# Patient Record
Sex: FEMALE
[De-identification: names, ages, dates, MRNs, and addresses within clinical notes are randomized per-mention and may not be internally consistent; named-entity substitution may affect disease eponyms.]

---

## 2017-08-23 ENCOUNTER — RESULT REVIEW (OUTPATIENT)
Age: 57
End: 2017-08-23

## 2018-07-31 ENCOUNTER — APPOINTMENT (OUTPATIENT)
Dept: BREAST CENTER | Facility: CLINIC | Age: 58
End: 2018-07-31

## 2019-05-23 ENCOUNTER — RECORD ABSTRACTING (OUTPATIENT)
Age: 59
End: 2019-05-23

## 2019-05-23 DIAGNOSIS — Z78.0 ASYMPTOMATIC MENOPAUSAL STATE: ICD-10-CM

## 2019-05-23 DIAGNOSIS — Z87.42 PERSONAL HISTORY OF OTHER DISEASES OF THE FEMALE GENITAL TRACT: ICD-10-CM

## 2019-05-23 DIAGNOSIS — Z85.3 PERSONAL HISTORY OF MALIGNANT NEOPLASM OF BREAST: ICD-10-CM

## 2019-05-23 DIAGNOSIS — Z87.898 PERSONAL HISTORY OF OTHER SPECIFIED CONDITIONS: ICD-10-CM

## 2019-05-23 DIAGNOSIS — Z92.89 PERSONAL HISTORY OF OTHER MEDICAL TREATMENT: ICD-10-CM

## 2019-05-23 DIAGNOSIS — Z80.8 FAMILY HISTORY OF MALIGNANT NEOPLASM OF OTHER ORGANS OR SYSTEMS: ICD-10-CM

## 2019-05-23 DIAGNOSIS — Z86.39 PERSONAL HISTORY OF OTHER ENDOCRINE, NUTRITIONAL AND METABOLIC DISEASE: ICD-10-CM

## 2019-05-23 LAB — CYTOLOGY CVX/VAG DOC THIN PREP: NORMAL

## 2019-05-24 ENCOUNTER — APPOINTMENT (OUTPATIENT)
Dept: OBGYN | Facility: CLINIC | Age: 59
End: 2019-05-24
Payer: COMMERCIAL

## 2019-05-24 VITALS
SYSTOLIC BLOOD PRESSURE: 120 MMHG | WEIGHT: 144 LBS | BODY MASS INDEX: 23.99 KG/M2 | HEIGHT: 65 IN | DIASTOLIC BLOOD PRESSURE: 70 MMHG

## 2019-05-24 DIAGNOSIS — Z12.31 ENCOUNTER FOR SCREENING MAMMOGRAM FOR MALIGNANT NEOPLASM OF BREAST: ICD-10-CM

## 2019-05-24 LAB
BILIRUB UR QL STRIP: NORMAL
DATE COLLECTED: NORMAL
GLUCOSE UR-MCNC: NORMAL
HCG UR QL: 0.2 EU/DL
HEMOCCULT SP1 STL QL: NEGATIVE
HGB UR QL STRIP.AUTO: NORMAL
KETONES UR-MCNC: NORMAL
LEUKOCYTE ESTERASE UR QL STRIP: NORMAL
NITRITE UR QL STRIP: NORMAL
PH UR STRIP: 5.5
PROT UR STRIP-MCNC: NORMAL
QUALITY CONTROL: YES
SP GR UR STRIP: 1.01

## 2019-05-24 PROCEDURE — 81003 URINALYSIS AUTO W/O SCOPE: CPT | Mod: QW

## 2019-05-24 PROCEDURE — 99396 PREV VISIT EST AGE 40-64: CPT

## 2019-05-24 PROCEDURE — 82270 OCCULT BLOOD FECES: CPT

## 2019-05-24 RX ORDER — DENOSUMAB 60 MG/ML
60 INJECTION SUBCUTANEOUS
Refills: 0 | Status: DISCONTINUED | COMMUNITY
End: 2019-05-24

## 2019-05-24 NOTE — REVIEW OF SYSTEMS
[Nl] : Integumentary [Fever] : no fever [Chills] : no chills [Chest Pain] : no chest pain [Dyspnea] : no shortness of breath [Vomiting] : no vomiting [Abdominal Pain] : no abdominal pain [Pelvic Pain] : no pelvic pain [Abn Vag Bleeding] : no abnormal vaginal bleeding

## 2019-05-24 NOTE — DISCUSSION/SUMMARY
[FreeTextEntry1] : No pap done since cervix is absent.\par \par Continue breast care with specialist.

## 2019-05-24 NOTE — HISTORY OF PRESENT ILLNESS
[Last Mammogram ___] : Last Mammogram was [unfilled] [Last Pap ___] : Last cervical pap smear was [unfilled] [Currently In Menopause] : currently in menopause [Sexually Active] : is sexually active [Monogamous] : is monogamous [Dyspareunia] : dyspareunia [unknown] : the patient is unsure of the date of her LMP [Menarche Age: ____] : age at menarche was [unfilled] [de-identified] : BRUS 2/19/18 BIRAD 3 [Itching] : no itching [Mass] : no mass [Burning] : no burning [Stinging] : no stinging [Lesion] : no lesion [Soreness] : no soreness [Discharge] : no discharge [FreeTextEntry8] : Vaginal dryness [Contraception] : does not use contraception

## 2019-06-13 ENCOUNTER — ASOB RESULT (OUTPATIENT)
Age: 59
End: 2019-06-13

## 2019-06-13 ENCOUNTER — APPOINTMENT (OUTPATIENT)
Dept: OBGYN | Facility: CLINIC | Age: 59
End: 2019-06-13
Payer: COMMERCIAL

## 2019-06-13 PROCEDURE — 76830 TRANSVAGINAL US NON-OB: CPT

## 2020-10-01 ENCOUNTER — APPOINTMENT (OUTPATIENT)
Dept: OBGYN | Facility: CLINIC | Age: 60
End: 2020-10-01
Payer: COMMERCIAL

## 2020-10-01 VITALS
DIASTOLIC BLOOD PRESSURE: 70 MMHG | WEIGHT: 135 LBS | SYSTOLIC BLOOD PRESSURE: 115 MMHG | HEIGHT: 64 IN | BODY MASS INDEX: 23.05 KG/M2

## 2020-10-01 DIAGNOSIS — Z82.69 FAMILY HISTORY OF OTHER DISEASES OF THE MUSCULOSKELETAL SYSTEM AND CONNECTIVE TISSUE: ICD-10-CM

## 2020-10-01 PROCEDURE — 99396 PREV VISIT EST AGE 40-64: CPT

## 2020-10-01 PROCEDURE — XXXXX: CPT

## 2020-10-01 NOTE — PHYSICAL EXAM
[Appropriately responsive] : appropriately responsive [Alert] : alert [No Acute Distress] : no acute distress [Oriented x3] : oriented x3 [No Masses] : no breast masses were palpable [Labia Majora] : normal [Labia Minora] : normal [Normal] : normal [Atrophy] : atrophy [Absent] : absent

## 2020-12-21 PROBLEM — Z12.31 ENCOUNTER FOR SCREENING MAMMOGRAM FOR BREAST CANCER: Status: RESOLVED | Noted: 2019-05-24 | Resolved: 2020-12-21

## 2021-04-23 NOTE — PLAN
[FreeTextEntry1] : The benefits of adequate calcium and vitamin D3 intake combined with weight bearing exercise for bone protection were reviewed.  DEXA rx provided- pt recommended to see rheumatology for consult if osteoporotic.\par \par - continue follow up with Dr. Castañeda (breast surgeon) as planned\par - pap deferred- pt s/p total hysterectomy\par - continue coconut oil for vaginal dryness.

## 2021-10-04 ENCOUNTER — APPOINTMENT (OUTPATIENT)
Dept: OBGYN | Facility: CLINIC | Age: 61
End: 2021-10-04
Payer: COMMERCIAL

## 2021-10-04 VITALS
DIASTOLIC BLOOD PRESSURE: 76 MMHG | HEIGHT: 64 IN | SYSTOLIC BLOOD PRESSURE: 110 MMHG | BODY MASS INDEX: 23.56 KG/M2 | WEIGHT: 138 LBS

## 2021-10-04 DIAGNOSIS — R39.16 STRAINING TO VOID: ICD-10-CM

## 2021-10-04 DIAGNOSIS — Z87.448 PERSONAL HISTORY OF OTHER DISEASES OF URINARY SYSTEM: ICD-10-CM

## 2021-10-04 DIAGNOSIS — Z13.820 ENCOUNTER FOR SCREENING FOR OSTEOPOROSIS: ICD-10-CM

## 2021-10-04 DIAGNOSIS — Z87.898 PERSONAL HISTORY OF OTHER SPECIFIED CONDITIONS: ICD-10-CM

## 2021-10-04 DIAGNOSIS — Z01.419 ENCOUNTER FOR GYNECOLOGICAL EXAMINATION (GENERAL) (ROUTINE) W/OUT ABNORMAL FINDINGS: ICD-10-CM

## 2021-10-04 PROCEDURE — 82270 OCCULT BLOOD FECES: CPT

## 2021-10-04 PROCEDURE — 99396 PREV VISIT EST AGE 40-64: CPT

## 2021-10-05 LAB
DATE COLLECTED: NORMAL
HEMOCCULT SP1 STL QL: NEGATIVE
QUALITY CONTROL: YES

## 2021-10-06 PROBLEM — Z01.419 WELL WOMAN EXAM WITH ROUTINE GYNECOLOGICAL EXAM: Status: RESOLVED | Noted: 2019-05-24 | Resolved: 2021-10-06

## 2021-10-06 PROBLEM — Z13.820 SCREENING FOR OSTEOPOROSIS: Status: RESOLVED | Noted: 2020-10-01 | Resolved: 2021-10-06

## 2021-10-06 NOTE — HISTORY OF PRESENT ILLNESS
[Patient reported PAP Smear was normal] : Patient reported PAP Smear was normal [LMP unknown] : LMP unknown [postmenopausal] : postmenopausal [N] : Patient does not use contraception [Y] : Positive pregnancy history [unknown] : Patient is unsure of the date of her LMP [Menarche Age: ____] : age at menarche was [unfilled] [Currently Active] : currently active [Men] : men [No] : No [FreeTextEntry1] : JULISA 62 yo  LMP unknown is here today for an annual exam. She is without any gynecological complaints.\par \par Reports receiving breast care from Dr. Ardon (breast surgeon). [TextBox_4] : Pt presents today for an Annual. \par  [Mammogramdate] : 08/15/2019 [TextBox_19] : BR2 [BreastSonogramDate] : 02/19/2018 [TextBox_25] : LT Breast  BR3 [PapSmeardate] : 08/23/2017 [BoneDensityDate] : 04/24/2017 [TextBox_37] : Osteoporosis [PGHxTotal] : 3 [[facility]] : 2 [PGxPremature] : 1 [[facility]] : 3

## 2021-10-06 NOTE — END OF VISIT
[FreeTextEntry3] : I, [Woody Graham] solely acted as scribe for Dr. Cheryl Butcher on 10/04/2021 \par All medical entries made by the Scribe were at my, Dr. Butcher’s, direction and personally\par dictated by me on 10/04/2021 . I have reviewed the chart and agree that the record\par accurately reflects my personal performance of the history, physical exam, assessment and plan. I\par have also personally directed, reviewed, and agreed with the chart.

## 2021-10-06 NOTE — PHYSICAL EXAM
[Appropriately responsive] : appropriately responsive [Alert] : alert [No Acute Distress] : no acute distress [Soft] : soft [Non-tender] : non-tender [Non-distended] : non-distended [Oriented x3] : oriented x3 [Examination Of The Breasts] : a normal appearance [No Discharge] : no discharge [No Masses] : no breast masses were palpable [Normal] : normal external genitalia [Labia Majora] : normal [Labia Minora] : normal [No Bleeding] : There was no active vaginal bleeding [Atrophy] : atrophy [Absent] : absent [Dry Mucosa] : dry mucosa [FreeTextEntry9] : Stool for occult blood.

## 2021-10-06 NOTE — DISCUSSION/SUMMARY
[FreeTextEntry1] : Pap done today.\par Self-breast exam reviewed.\par Advised to continue breast care with Dr. Castañeda.\par She will follow up for pelvic sonogram given H/O breast cancer..\par

## 2022-01-05 ENCOUNTER — APPOINTMENT (OUTPATIENT)
Dept: OBGYN | Facility: CLINIC | Age: 62
End: 2022-01-05

## 2022-10-18 NOTE — HISTORY OF PRESENT ILLNESS
Anesthesia Evaluation     Patient summary reviewed and Nursing notes reviewed   NPO Solid Status: > 8 hours  NPO Liquid Status: > 2 hours           Airway   Mallampati: II  TM distance: >3 FB  Neck ROM: full  Dental - normal exam     Pulmonary - negative pulmonary ROS and normal exam    breath sounds clear to auscultation  Cardiovascular - normal exam    ECG reviewed  Patient on routine beta blocker  Rhythm: regular  Rate: normal    (+) hypertension 2 medications or greater, hyperlipidemia,   (-) angina, orthopnea, PND, VERGARA    ROS comment: Sinus rhythm  Left ventricular hypertrophy  No Prior Tracing for Comparison    Neuro/Psych- negative ROS  GI/Hepatic/Renal/Endo    (+) obesity,       Musculoskeletal (-) negative ROS    Abdominal    Substance History - negative use     OB/GYN negative ob/gyn ROS         Other      history of cancer (Left breast cancer)                    Anesthesia Plan    ASA 2     general     intravenous induction     Anesthetic plan, risks, benefits, and alternatives have been provided, discussed and informed consent has been obtained with: patient.        CODE STATUS:        [Patient reported PAP Smear was normal] : Patient reported PAP Smear was normal [LMP unknown] : LMP unknown [postmenopausal] : postmenopausal [N] : Patient does not use contraception [Y] : Positive pregnancy history [unknown] : Patient is unsure of the date of her LMP [Menarche Age: ____] : age at menarche was [unfilled] [Currently Active] : currently active [Men] : men [No] : No [TextBox_4] : Pt presents today for an Annual. she denies gyn complaints at this time.\par  [Mammogramdate] : 08/15/2019 [TextBox_19] : BR2 [BreastSonogramDate] : 02/19/2018 [TextBox_25] : LT Breast  BR3 [PapSmeardate] : 08/23/2017 [TextBox_37] : Osteoporosis [BoneDensityDate] : 04/24/2017 [PGHxTotal] : 3 [[facility]] : 2 [PGxPremature] : 1 [[facility]] : 3

## 2022-11-08 ENCOUNTER — OFFICE (OUTPATIENT)
Dept: URBAN - METROPOLITAN AREA CLINIC 12 | Facility: CLINIC | Age: 62
Setting detail: OPHTHALMOLOGY
End: 2022-11-08
Payer: COMMERCIAL

## 2022-11-08 ENCOUNTER — NON-APPOINTMENT (OUTPATIENT)
Age: 62
End: 2022-11-08

## 2022-11-08 ENCOUNTER — APPOINTMENT (OUTPATIENT)
Dept: OBGYN | Facility: CLINIC | Age: 62
End: 2022-11-08

## 2022-11-08 VITALS
BODY MASS INDEX: 22.02 KG/M2 | SYSTOLIC BLOOD PRESSURE: 112 MMHG | WEIGHT: 129 LBS | HEIGHT: 64 IN | DIASTOLIC BLOOD PRESSURE: 70 MMHG

## 2022-11-08 DIAGNOSIS — H04.123: ICD-10-CM

## 2022-11-08 DIAGNOSIS — Z92.89 PERSONAL HISTORY OF OTHER MEDICAL TREATMENT: ICD-10-CM

## 2022-11-08 DIAGNOSIS — H25.13: ICD-10-CM

## 2022-11-08 DIAGNOSIS — B02.1: ICD-10-CM

## 2022-11-08 DIAGNOSIS — H43.393: ICD-10-CM

## 2022-11-08 DIAGNOSIS — R92.2 INCONCLUSIVE MAMMOGRAM: ICD-10-CM

## 2022-11-08 PROCEDURE — 99396 PREV VISIT EST AGE 40-64: CPT

## 2022-11-08 PROCEDURE — 92014 COMPRE OPH EXAM EST PT 1/>: CPT | Performed by: OPHTHALMOLOGY

## 2022-11-08 ASSESSMENT — AXIALLENGTH_DERIVED
OS_AL: 22.8181
OD_AL: 22.907
OD_AL: 22.9992
OS_AL: 22.9096
OD_AL: 22.907
OS_AL: 22.8181

## 2022-11-08 ASSESSMENT — REFRACTION_MANIFEST
OD_ADD: +2.00
OD_AXIS: 085
OS_AXIS: 115
OD_AXIS: 090
OD_SPHERE: +1.00
OS_SPHERE: +1.00
OS_AXIS: 130
OS_VA1: 20/25-1
OS_SPHERE: +0.75
OD_CYLINDER: -0.50
OS_CYLINDER: -0.25
OD_CYLINDER: -0.50
OD_SPHERE: +0.75
OS_VA1: 20/20
OD_VA1: 20/30-2
OS_ADD: +2.00
OS_CYLINDER: -0.25

## 2022-11-08 ASSESSMENT — REFRACTION_CURRENTRX
OD_CYLINDER: -0.25
OD_AXIS: 093
OS_SPHERE: +2.00
OS_VPRISM_DIRECTION: SV
OD_VPRISM_DIRECTION: SV
OS_OVR_VA: 20/
OD_VPRISM_DIRECTION: SV
OS_VPRISM_DIRECTION: SV
OD_SPHERE: +2.00
OD_SPHERE: +2.00
OD_OVR_VA: 20/
OS_OVR_VA: 20/
OS_SPHERE: +2.00
OS_CYLINDER: SPHERE
OD_OVR_VA: 20/

## 2022-11-08 ASSESSMENT — REFRACTION_AUTOREFRACTION
OD_AXIS: 086
OS_AXIS: 128
OD_SPHERE: +1.00
OS_SPHERE: +1.00
OD_CYLINDER: -0.50
OS_CYLINDER: -0.25

## 2022-11-08 ASSESSMENT — SPHEQUIV_DERIVED
OS_SPHEQUIV: 0.875
OD_SPHEQUIV: 0.75
OS_SPHEQUIV: 0.625
OD_SPHEQUIV: 0.75
OD_SPHEQUIV: 0.5
OS_SPHEQUIV: 0.875

## 2022-11-08 ASSESSMENT — KERATOMETRY
OD_K1POWER_DIOPTERS: 44.50
OS_K1POWER_DIOPTERS: 44.50
METHOD_AUTO_MANUAL: AUTO
OS_AXISANGLE_DEGREES: 066
OD_K2POWER_DIOPTERS: 44.75
OD_AXISANGLE_DEGREES: 142
OS_K2POWER_DIOPTERS: 45.00

## 2022-11-08 ASSESSMENT — SUPERFICIAL PUNCTATE KERATITIS (SPK)
OS_SPK: T
OD_SPK: T

## 2022-11-08 ASSESSMENT — TONOMETRY
OD_IOP_MMHG: 14
OS_IOP_MMHG: 14

## 2022-11-08 ASSESSMENT — VISUAL ACUITY
OS_BCVA: 20/20-3
OD_BCVA: 20/20-1

## 2022-11-08 ASSESSMENT — CONFRONTATIONAL VISUAL FIELD TEST (CVF)
OD_FINDINGS: FULL
OS_FINDINGS: FULL

## 2022-11-08 NOTE — PLAN
[FreeTextEntry1] : Continue care with breast specialist given H/O right breast cancer.\par \par Check pelvic sono.\par \par Pap not done secondary to surgically absent cervix.

## 2022-11-08 NOTE — HISTORY OF PRESENT ILLNESS
[N] : Patient does not use contraception [Y] : Positive pregnancy history [Menarche Age: ____] : age at menarche was [unfilled] [Currently Active] : currently active [No] : No [Patient refuses STI testing] : Patient refuses STI testing [Mammogramdate] : 08/25/22 [TextBox_19] : BR 2  [BreastSonogramDate] : 08/25/22 [TextBox_25] : BR 2  [PapSmeardate] : 08/23/17 [TextBox_31] : NEG  [ColonoscopyDate] : 2017 [LMPDate] : 2002 [PGHxTotal] : 3 [[facility]] : 2 [PGxPremature] : 1 [PGHxAbortions] : 1 [[facility]] : 3 [FreeTextEntry1] : 2002

## 2022-11-08 NOTE — PHYSICAL EXAM
[Appropriately responsive] : appropriately responsive [Alert] : alert [No Acute Distress] : no acute distress [Soft] : soft [Non-tender] : non-tender [Non-distended] : non-distended [Oriented x3] : oriented x3 [FreeTextEntry6] : Scar right breast  [Examination Of The Breasts] : a normal appearance [No Masses] : no breast masses were palpable [Normal] : normal external genitalia [Labia Majora] : normal [Labia Minora] : normal [Atrophy] : atrophy [Dry Mucosa] : dry mucosa [Absent] : absent [Uterine Adnexae] : absent [No Tenderness] : no tenderness

## 2023-01-03 ENCOUNTER — APPOINTMENT (OUTPATIENT)
Dept: OBGYN | Facility: CLINIC | Age: 63
End: 2023-01-03
Payer: COMMERCIAL

## 2023-01-03 ENCOUNTER — APPOINTMENT (OUTPATIENT)
Dept: ANTEPARTUM | Facility: CLINIC | Age: 63
End: 2023-01-03
Payer: COMMERCIAL

## 2023-01-03 ENCOUNTER — ASOB RESULT (OUTPATIENT)
Age: 63
End: 2023-01-03

## 2023-01-03 VITALS
SYSTOLIC BLOOD PRESSURE: 122 MMHG | BODY MASS INDEX: 21.34 KG/M2 | WEIGHT: 125 LBS | DIASTOLIC BLOOD PRESSURE: 70 MMHG | HEIGHT: 64 IN

## 2023-01-03 PROCEDURE — 76830 TRANSVAGINAL US NON-OB: CPT

## 2023-01-03 PROCEDURE — 99213 OFFICE O/P EST LOW 20 MIN: CPT

## 2023-01-03 NOTE — DISCUSSION/SUMMARY
[FreeTextEntry1] : Previous sono results reviewed in conjunction with today's sono findings. Sono results were wnl. Repeat imaging recommended yearly secondary to a personal hx of breast cancer.\par \par F/u annual exam and pelvic exam or as needed.

## 2023-01-03 NOTE — HISTORY OF PRESENT ILLNESS
[N] : Patient does not use contraception [Y] : Patient is sexually active [Menarche Age: ____] : age at menarche was [unfilled] [Currently Active] : currently active [Men] : men [Vaginal] : vaginal [No] : No [TextBox_4] : Sono / Follow up [Mammogramdate] : 8/25/22 [TextBox_19] : br 2 [BreastSonogramDate] : 8/29/22 [TextBox_25] : br 2 [PapSmeardate] : 8/23/17 [TextBox_31] : vaginal negative / pap    ABSENT CERVIX  [ColonoscopyDate] : 2017 [LMPDate] : 2002 [PGHxTotal] : 3 [[facility]] : 2 [PGxPremature] : 1 [[facility]] : 3 [TextBox_6] : 2002 [TextBox_9] : 13 [FreeTextEntry1] : 2002

## 2023-01-03 NOTE — END OF VISIT
[FreeTextEntry3] : I, Santos Mckay solely acted as scribe for Dr. Cheryl Butcher on 01/03/2023 \par All medical entries made by the Scribe were at my, Dr. Butcher’s, direction and personally\par dictated by me on 01/03/2023 . I have reviewed the chart and agree that the record\par accurately reflects my personal performance of the history, physical exam, assessment and plan. I\par have also personally directed, reviewed, and agreed with the chart.

## 2023-03-15 PROBLEM — H26.493 POSTERIOR CAPSULAR OPACIFICATION ; BOTH EYES: Status: ACTIVE | Noted: 2023-03-15

## 2023-12-11 PROBLEM — H40.013: Status: ACTIVE | Noted: 2023-12-11

## 2024-01-09 ENCOUNTER — OFFICE (OUTPATIENT)
Dept: URBAN - METROPOLITAN AREA CLINIC 12 | Facility: CLINIC | Age: 64
Setting detail: OPHTHALMOLOGY
End: 2024-01-09
Payer: COMMERCIAL

## 2024-01-09 DIAGNOSIS — H25.13: ICD-10-CM

## 2024-01-09 DIAGNOSIS — H04.123: ICD-10-CM

## 2024-01-09 DIAGNOSIS — B02.1: ICD-10-CM

## 2024-01-09 DIAGNOSIS — H43.393: ICD-10-CM

## 2024-01-09 PROCEDURE — 92014 COMPRE OPH EXAM EST PT 1/>: CPT | Performed by: OPHTHALMOLOGY

## 2024-01-09 ASSESSMENT — REFRACTION_CURRENTRX
OD_OVR_VA: 20/
OS_VPRISM_DIRECTION: SV
OD_AXIS: 093
OD_CYLINDER: -0.25
OS_OVR_VA: 20/
OS_SPHERE: +2.00
OS_OVR_VA: 20/
OS_VPRISM_DIRECTION: SV
OD_SPHERE: +2.00
OS_CYLINDER: SPHERE
OD_VPRISM_DIRECTION: SV
OD_SPHERE: +2.00
OD_VPRISM_DIRECTION: SV
OD_OVR_VA: 20/
OS_SPHERE: +2.00

## 2024-01-09 ASSESSMENT — REFRACTION_MANIFEST
OD_ADD: +2.00
OS_CYLINDER: -0.25
OS_SPHERE: +0.75
OS_CYLINDER: -0.25
OD_VA1: 20/30-2
OS_AXIS: 130
OS_SPHERE: +1.00
OD_AXIS: 085
OD_SPHERE: +0.75
OD_AXIS: 090
OD_CYLINDER: -0.50
OS_VA1: 20/20
OS_ADD: +2.00
OD_SPHERE: +1.00
OS_VA1: 20/25-1
OD_CYLINDER: -0.50
OS_AXIS: 115

## 2024-01-09 ASSESSMENT — SUPERFICIAL PUNCTATE KERATITIS (SPK)
OD_SPK: T
OS_SPK: T

## 2024-01-09 ASSESSMENT — REFRACTION_AUTOREFRACTION
OS_AXIS: 99
OD_SPHERE: +1.25
OS_SPHERE: +1.25
OD_CYLINDER: -0.75
OD_AXIS: 82
OS_CYLINDER: -0.25

## 2024-01-09 ASSESSMENT — SPHEQUIV_DERIVED
OD_SPHEQUIV: 0.75
OS_SPHEQUIV: 0.875
OS_SPHEQUIV: 0.625
OD_SPHEQUIV: 0.5
OS_SPHEQUIV: 1.125
OD_SPHEQUIV: 0.875

## 2024-01-09 ASSESSMENT — CONFRONTATIONAL VISUAL FIELD TEST (CVF)
OD_FINDINGS: FULL
OS_FINDINGS: FULL

## 2024-01-25 ENCOUNTER — APPOINTMENT (OUTPATIENT)
Dept: OBGYN | Facility: CLINIC | Age: 64
End: 2024-01-25

## 2024-01-25 ENCOUNTER — APPOINTMENT (OUTPATIENT)
Dept: ANTEPARTUM | Facility: CLINIC | Age: 64
End: 2024-01-25

## 2024-03-07 ENCOUNTER — APPOINTMENT (OUTPATIENT)
Dept: ANTEPARTUM | Facility: CLINIC | Age: 64
End: 2024-03-07
Payer: COMMERCIAL

## 2024-03-07 ENCOUNTER — ASOB RESULT (OUTPATIENT)
Age: 64
End: 2024-03-07

## 2024-03-07 ENCOUNTER — APPOINTMENT (OUTPATIENT)
Dept: OBGYN | Facility: CLINIC | Age: 64
End: 2024-03-07
Payer: COMMERCIAL

## 2024-03-07 VITALS
SYSTOLIC BLOOD PRESSURE: 122 MMHG | BODY MASS INDEX: 23.25 KG/M2 | WEIGHT: 136.2 LBS | HEIGHT: 64 IN | DIASTOLIC BLOOD PRESSURE: 74 MMHG

## 2024-03-07 DIAGNOSIS — N95.2 POSTMENOPAUSAL ATROPHIC VAGINITIS: ICD-10-CM

## 2024-03-07 DIAGNOSIS — Z01.411 ENCOUNTER FOR GYNECOLOGICAL EXAMINATION (GENERAL) (ROUTINE) WITH ABNORMAL FINDINGS: ICD-10-CM

## 2024-03-07 DIAGNOSIS — N89.8 OTHER SPECIFIED NONINFLAMMATORY DISORDERS OF VAGINA: ICD-10-CM

## 2024-03-07 DIAGNOSIS — Z92.3 PERSONAL HISTORY OF IRRADIATION: ICD-10-CM

## 2024-03-07 DIAGNOSIS — M81.0 AGE-RELATED OSTEOPOROSIS W/OUT CURRENT PATHOLOGICAL FRACTURE: ICD-10-CM

## 2024-03-07 DIAGNOSIS — Z98.890 OTHER SPECIFIED POSTPROCEDURAL STATES: ICD-10-CM

## 2024-03-07 DIAGNOSIS — C50.919 MALIGNANT NEOPLASM OF UNSPECIFIED SITE OF UNSPECIFIED FEMALE BREAST: ICD-10-CM

## 2024-03-07 DIAGNOSIS — Z12.11 ENCOUNTER FOR SCREENING FOR MALIGNANT NEOPLASM OF COLON: ICD-10-CM

## 2024-03-07 DIAGNOSIS — N94.10 UNSPECIFIED DYSPAREUNIA: ICD-10-CM

## 2024-03-07 DIAGNOSIS — Z01.419 ENCOUNTER FOR GYNECOLOGICAL EXAMINATION (GENERAL) (ROUTINE) W/OUT ABNORMAL FINDINGS: ICD-10-CM

## 2024-03-07 DIAGNOSIS — Z92.29 PERSONAL HISTORY OF OTHER DRUG THERAPY: ICD-10-CM

## 2024-03-07 PROCEDURE — 76830 TRANSVAGINAL US NON-OB: CPT

## 2024-03-07 PROCEDURE — 76856 US EXAM PELVIC COMPLETE: CPT

## 2024-03-07 PROCEDURE — 99213 OFFICE O/P EST LOW 20 MIN: CPT | Mod: 25

## 2024-03-07 PROCEDURE — 99396 PREV VISIT EST AGE 40-64: CPT

## 2024-03-10 NOTE — HISTORY OF PRESENT ILLNESS
[Post-Menopause, No Sxs] : post-menopausal, currently without symptoms [Menarche Age: ____] : age at menarche was [unfilled] [No] : Patient does not have concerns regarding sex [Y] : Patient uses contraception [Menopause Age: ____] : age at menopause was [unfilled] [Currently Active] : currently active [TextBox_19] : [facility]. TASK SENT TO [facility] RESULTS [Mammogramdate] : 9/2023 [TextBox_31] : NEG [PapSmeardate] : 8/23/17 [ColonoscopyDate] : 2017 [TextBox_37] : OSTEOPOROSIS ; RESULTS OBTAINED FROM [facility] PORTAL [BoneDensityDate] : 2017 [TextBox_43] : PER PT [LMPDate] : 2002  [[facility]] : 2 [PGxPremature] : 1 [PGHxTotal] : 3 [FreeTextEntry1] : 2002 [[facility]] : 3

## 2024-03-10 NOTE — PLAN
[FreeTextEntry1] : We discussed the benefits of over-the-counter vaginal moisturizers. We discussed the fact that these are recommended to be used regularly 2-3 times a week for maximum benefit. In addition, she will use vaginal lubricants for sexual activity. If the vaginal moisturizers do not provide enough relief for her, we will consider vaginal estrogen therapy, but the patient will discuss first with Dr. Vaz her breast oncologist.   Previous sono results reviewed in conjunction with today's sono findings. Sono results were wnl. Repeat imaging recommended in a year secondary to her personal history of breast cancer. Prescription for a transvaginal sonogram given.   Pap done today.  Prescription for DEXA studies were given secondary to a history of osteoporosis. If it is the same or worse, we will send her to rheumatology. Prior T score -2.7.  Self-breast exam reviewed.  She will follow up for annual exam with pelvic sono and as needed.

## 2024-03-10 NOTE — PHYSICAL EXAM
[Appropriately responsive] : appropriately responsive [Chaperone Present] : A chaperone was present in the examining room during all aspects of the physical examination [Alert] : alert [No Acute Distress] : no acute distress [Soft] : soft [Non-tender] : non-tender [Non-distended] : non-distended [Oriented x3] : oriented x3 [Examination Of The Breasts] : a normal appearance [No Discharge] : no discharge [No Masses] : no breast masses were palpable [Labia Majora] : normal [Labia Minora] : normal [Atrophy] : atrophy [Normal] : normal [Dry Mucosa] : dry mucosa [No Bleeding] : There was no active vaginal bleeding [Absent] : absent [Uterine Adnexae] : absent [FreeTextEntry6] : Lumpectomy scar RT, breast reduction scar LT

## 2024-03-10 NOTE — END OF VISIT
[FreeTextEntry3] : I, Santos Mckay solely acted as scribe for Dr. Cheryl Butcher on 03/07/2024  All medical entries made by the Scribe were at my, Dr. Benson, direction and personally dictated by me on 03/07/2024 . I have reviewed the chart and agree that the record accurately reflects my personal performance of the history, physical exam, assessment and plan. I have also personally directed, reviewed, and agreed with the chart.

## 2024-03-10 NOTE — REVIEW OF SYSTEMS
[Negative] : Genitourinary [Genital Rash/Irritation] : genital rash/irritation [de-identified] : Lumpectomy scar RT, breast reduction scar LT

## 2024-03-11 ENCOUNTER — NON-APPOINTMENT (OUTPATIENT)
Age: 64
End: 2024-03-11

## 2024-03-13 PROBLEM — H26.493 POSTERIOR CAPSULAR OPACIFICATION ; BOTH EYES: Status: ACTIVE | Noted: 2024-03-13

## 2024-04-01 ENCOUNTER — NON-APPOINTMENT (OUTPATIENT)
Age: 64
End: 2024-04-01

## 2024-05-15 ENCOUNTER — APPOINTMENT (OUTPATIENT)
Dept: OBGYN | Facility: CLINIC | Age: 64
End: 2024-05-15

## 2024-05-20 ENCOUNTER — NON-APPOINTMENT (OUTPATIENT)
Age: 64
End: 2024-05-20

## 2025-01-07 ENCOUNTER — OFFICE (OUTPATIENT)
Dept: URBAN - METROPOLITAN AREA CLINIC 12 | Facility: CLINIC | Age: 65
Setting detail: OPHTHALMOLOGY
End: 2025-01-07
Payer: MEDICARE

## 2025-01-07 DIAGNOSIS — H90.3: ICD-10-CM

## 2025-01-07 DIAGNOSIS — H04.123: ICD-10-CM

## 2025-01-07 DIAGNOSIS — H35.373: ICD-10-CM

## 2025-01-07 DIAGNOSIS — H25.13: ICD-10-CM

## 2025-01-07 DIAGNOSIS — B02.1: ICD-10-CM

## 2025-01-07 DIAGNOSIS — H43.393: ICD-10-CM

## 2025-01-07 PROCEDURE — 92567 TYMPANOMETRY: CPT | Mod: AB

## 2025-01-07 PROCEDURE — 92557 COMPREHENSIVE HEARING TEST: CPT | Mod: AB

## 2025-01-07 PROCEDURE — 92134 CPTRZ OPH DX IMG PST SGM RTA: CPT | Performed by: OPHTHALMOLOGY

## 2025-01-07 PROCEDURE — 92014 COMPRE OPH EXAM EST PT 1/>: CPT | Performed by: OPHTHALMOLOGY

## 2025-01-07 ASSESSMENT — REFRACTION_CURRENTRX
OD_OVR_VA: 20/
OD_AXIS: 099
OS_OVR_VA: 20/
OS_OVR_VA: 20/
OD_VPRISM_DIRECTION: SV
OS_CYLINDER: SPHERE
OS_SPHERE: +2.00
OS_VPRISM_DIRECTION: SV
OD_VPRISM_DIRECTION: SV
OD_SPHERE: +2.00
OD_CYLINDER: -0.25
OS_VPRISM_DIRECTION: SV
OD_OVR_VA: 20/
OS_AXIS: 000
OS_CYLINDER: SPH
OD_AXIS: 093
OD_CYLINDER: -0.50
OD_SPHERE: +2.25
OS_SPHERE: +2.25

## 2025-01-07 ASSESSMENT — REFRACTION_MANIFEST
OS_SPHERE: +0.75
OD_AXIS: 090
OD_SPHERE: +0.75
OS_CYLINDER: -0.25
OD_CYLINDER: -0.50
OD_VA1: 20/30-2
OS_AXIS: 115
OD_ADD: +2.00
OS_ADD: +2.00
OS_VA1: 20/25-1

## 2025-01-07 ASSESSMENT — KERATOMETRY
OS_K2POWER_DIOPTERS: 45.00
OD_K1POWER_DIOPTERS: 44.25
OD_K2POWER_DIOPTERS: 44.50
OD_AXISANGLE_DEGREES: 173
METHOD_AUTO_MANUAL: AUTO
OS_AXISANGLE_DEGREES: 062
OS_K1POWER_DIOPTERS: 44.25

## 2025-01-07 ASSESSMENT — CONFRONTATIONAL VISUAL FIELD TEST (CVF)
OS_FINDINGS: FULL
OD_FINDINGS: FULL

## 2025-01-07 ASSESSMENT — REFRACTION_AUTOREFRACTION
OS_SPHERE: +1.75
OD_AXIS: 090
OS_AXIS: 150
OD_CYLINDER: -0.75
OD_SPHERE: +1.25
OS_CYLINDER: -0.75

## 2025-01-07 ASSESSMENT — VISUAL ACUITY
OD_BCVA: 20/40-1
OS_BCVA: 20/30-2

## 2025-01-07 ASSESSMENT — SUPERFICIAL PUNCTATE KERATITIS (SPK)
OS_SPK: T
OD_SPK: T

## 2025-01-07 ASSESSMENT — TONOMETRY
OS_IOP_MMHG: 15
OD_IOP_MMHG: 14

## 2025-03-27 PROBLEM — H26.493 POSTERIOR CAPSULAR OPACIFICATION ; BOTH EYES: Status: ACTIVE | Noted: 2025-03-13

## 2025-04-01 ENCOUNTER — NON-APPOINTMENT (OUTPATIENT)
Age: 65
End: 2025-04-01

## 2025-05-14 ENCOUNTER — APPOINTMENT (OUTPATIENT)
Dept: ANTEPARTUM | Facility: CLINIC | Age: 65
End: 2025-05-14
Payer: MEDICARE

## 2025-05-14 ENCOUNTER — ASOB RESULT (OUTPATIENT)
Age: 65
End: 2025-05-14

## 2025-05-14 ENCOUNTER — APPOINTMENT (OUTPATIENT)
Dept: OBGYN | Facility: CLINIC | Age: 65
End: 2025-05-14
Payer: MEDICARE

## 2025-05-14 VITALS
SYSTOLIC BLOOD PRESSURE: 140 MMHG | BODY MASS INDEX: 23.22 KG/M2 | DIASTOLIC BLOOD PRESSURE: 100 MMHG | HEIGHT: 64 IN | WEIGHT: 136 LBS

## 2025-05-14 DIAGNOSIS — Z01.419 ENCOUNTER FOR GYNECOLOGICAL EXAMINATION (GENERAL) (ROUTINE) W/OUT ABNORMAL FINDINGS: ICD-10-CM

## 2025-05-14 PROCEDURE — 76830 TRANSVAGINAL US NON-OB: CPT

## 2025-05-14 PROCEDURE — 99397 PER PM REEVAL EST PAT 65+ YR: CPT | Mod: GY

## 2025-05-14 PROCEDURE — 76857 US EXAM PELVIC LIMITED: CPT | Mod: 59

## 2025-05-14 PROCEDURE — G0101: CPT

## 2025-05-21 ENCOUNTER — NON-APPOINTMENT (OUTPATIENT)
Age: 65
End: 2025-05-21